# Patient Record
Sex: FEMALE | Race: WHITE
[De-identification: names, ages, dates, MRNs, and addresses within clinical notes are randomized per-mention and may not be internally consistent; named-entity substitution may affect disease eponyms.]

---

## 2019-11-11 ENCOUNTER — HOSPITAL ENCOUNTER (OUTPATIENT)
Dept: HOSPITAL 53 - M SLEEP HO | Age: 49
End: 2019-11-11
Attending: PHYSICIAN ASSISTANT
Payer: COMMERCIAL

## 2019-11-11 DIAGNOSIS — R40.0: Primary | ICD-10-CM

## 2019-11-14 NOTE — SLEEPHOME
DATE OF PROCEDURE: 11/11/2019

 

Ordered by: ANTONINO Palacios

 

INDICATION:

Diagnostic home sleep testing was performed due to concern for the obstructive

sleep apnea syndrome.

 

For testing nocturnal T3 respiratory monitoring device was used.  Continuous

record was made of pulse, oxygen saturation, airflow, chest, abdominal strain and

body position.

 

9 hours and 59 minutes of data were reviewed.  There were 9 hours and 36 minutes

marked as time in bed.  During the interval marked time in bed there were 90

respiratory events identified of 10 seconds in duration or greater for

respiratory event index of 9.4.  The events were primarily obstructive, seven

mixed and central apneas were seen.  Baseline pulse rate 71 beats per minute,

pulse rate ranged .  Baseline saturation 94%.  Saturations fell to 87%.

Testing was performed in both the supine and nonsupine positions.

 

IMPRESSION

Abnormal home sleep testing with repetitive respiratory events and oxygen

desaturations to 87% with a respiratory event index of 9.4 is consistent with the

obstructive sleep apnea syndrome.

 

RECOMMENDATIONS

The patient should be encouraged to undergo formal sleep evaluation.